# Patient Record
Sex: FEMALE | Race: ASIAN | NOT HISPANIC OR LATINO
[De-identification: names, ages, dates, MRNs, and addresses within clinical notes are randomized per-mention and may not be internally consistent; named-entity substitution may affect disease eponyms.]

---

## 2022-01-25 ENCOUNTER — DASHBOARD ENCOUNTERS (OUTPATIENT)
Age: 57
End: 2022-01-25

## 2022-01-25 ENCOUNTER — OFFICE VISIT (OUTPATIENT)
Dept: URBAN - METROPOLITAN AREA CLINIC 46 | Facility: CLINIC | Age: 57
End: 2022-01-25
Payer: COMMERCIAL

## 2022-01-25 VITALS
BODY MASS INDEX: 27.72 KG/M2 | DIASTOLIC BLOOD PRESSURE: 86 MMHG | SYSTOLIC BLOOD PRESSURE: 138 MMHG | HEIGHT: 64 IN | WEIGHT: 162.4 LBS | TEMPERATURE: 98.1 F | HEART RATE: 73 BPM

## 2022-01-25 DIAGNOSIS — K21.9 GERD WITHOUT ESOPHAGITIS: ICD-10-CM

## 2022-01-25 PROBLEM — 266435005: Status: ACTIVE | Noted: 2022-01-25

## 2022-01-25 PROCEDURE — 99203 OFFICE O/P NEW LOW 30 MIN: CPT | Performed by: INTERNAL MEDICINE

## 2022-01-25 RX ORDER — ZOLPIDEM TARTRATE 5 MG/1
TAKE 1 TABLET BY MOUTH EVERY DAY AT BEDTIME TABLET, FILM COATED ORAL
Qty: 30 | Refills: 1 | Status: ACTIVE | COMMUNITY

## 2022-01-25 RX ORDER — LISINOPRIL 2.5 MG/1
TAKE 1 TABLET BY MOUTH EVERY DAY DIAGNOSIS UNAVAILABLE TABLET ORAL
Qty: 30 | Refills: 1 | Status: ACTIVE | COMMUNITY

## 2022-01-25 RX ORDER — SIMVASTATIN 20 MG/1
TABLET, FILM COATED ORAL
Qty: 30 | Status: ACTIVE | COMMUNITY

## 2022-01-25 RX ORDER — EMTRICITABINE, RILPIVIRINE HYDROCHLORIDE, AND TENOFOVIR ALAFENAMIDE 200; 25; 25 MG/1; MG/1; MG/1
TAKE 1 TABLET BY MOUTH ONCE DAILY WITH FOOD TABLET ORAL
Qty: 30 | Refills: 4 | Status: ACTIVE | COMMUNITY

## 2022-01-25 RX ORDER — GLIPIZIDE 5 MG/1
TAKE 1 TABLET BY MOUTH EVERY DAY 30 MINUTES BEFORE BREAKFAST DIAGNOSIS UNAVAILABLE TABLET ORAL
Qty: 90 | Refills: 0 | Status: ACTIVE | COMMUNITY

## 2022-01-25 RX ORDER — GABAPENTIN 300 MG/1
TAKE 1 CAPSULE BY MOUTH AT BEDTIME; MAY INCREASE TO 1 CAPSULE 2 TIMES A DAY IF NEEDED CAPSULE ORAL
Qty: 60 | Refills: 5 | Status: ACTIVE | COMMUNITY

## 2022-01-25 NOTE — HPI-TODAY'S VISIT:
Pt at average risk for CRC presents to discuss a screening colonoscopy. Pt has HIV and on HAART. Has GERD on pepcid. No change in bowels, pain, bleeding. No upper GI symptoms. Has DM and controlle with oral therapy.

## 2022-02-28 ENCOUNTER — OFFICE VISIT (OUTPATIENT)
Dept: URBAN - METROPOLITAN AREA SURGERY CENTER 28 | Facility: SURGERY CENTER | Age: 57
End: 2022-02-28

## 2022-03-21 PROBLEM — 709051008: Status: ACTIVE | Noted: 2022-03-21

## 2022-04-11 ENCOUNTER — OFFICE VISIT (OUTPATIENT)
Dept: URBAN - METROPOLITAN AREA SURGERY CENTER 28 | Facility: SURGERY CENTER | Age: 57
End: 2022-04-11
Payer: COMMERCIAL

## 2022-04-11 DIAGNOSIS — K64.1 BLEEDING GRADE II HEMORRHOIDS: ICD-10-CM

## 2022-04-11 DIAGNOSIS — Z12.11 COLON CANCER SCREENING: ICD-10-CM

## 2022-04-11 PROCEDURE — 45378 DIAGNOSTIC COLONOSCOPY: CPT | Performed by: INTERNAL MEDICINE

## 2022-04-11 PROCEDURE — G8907 PT DOC NO EVENTS ON DISCHARG: HCPCS | Performed by: INTERNAL MEDICINE

## 2022-04-11 RX ORDER — GABAPENTIN 300 MG/1
TAKE 1 CAPSULE BY MOUTH AT BEDTIME; MAY INCREASE TO 1 CAPSULE 2 TIMES A DAY IF NEEDED CAPSULE ORAL
Qty: 60 | Refills: 5 | Status: ACTIVE | COMMUNITY

## 2022-04-11 RX ORDER — EMTRICITABINE, RILPIVIRINE HYDROCHLORIDE, AND TENOFOVIR ALAFENAMIDE 200; 25; 25 MG/1; MG/1; MG/1
TAKE 1 TABLET BY MOUTH ONCE DAILY WITH FOOD TABLET ORAL
Qty: 30 | Refills: 4 | Status: ACTIVE | COMMUNITY

## 2022-04-11 RX ORDER — LISINOPRIL 2.5 MG/1
TAKE 1 TABLET BY MOUTH EVERY DAY DIAGNOSIS UNAVAILABLE TABLET ORAL
Qty: 30 | Refills: 1 | Status: ACTIVE | COMMUNITY

## 2022-04-11 RX ORDER — SIMVASTATIN 20 MG/1
TABLET, FILM COATED ORAL
Qty: 30 | Status: ACTIVE | COMMUNITY

## 2022-04-11 RX ORDER — GLIPIZIDE 5 MG/1
TAKE 1 TABLET BY MOUTH EVERY DAY 30 MINUTES BEFORE BREAKFAST DIAGNOSIS UNAVAILABLE TABLET ORAL
Qty: 90 | Refills: 0 | Status: ACTIVE | COMMUNITY

## 2022-04-11 RX ORDER — ZOLPIDEM TARTRATE 5 MG/1
TAKE 1 TABLET BY MOUTH EVERY DAY AT BEDTIME TABLET, FILM COATED ORAL
Qty: 30 | Refills: 1 | Status: ACTIVE | COMMUNITY